# Patient Record
Sex: MALE | URBAN - METROPOLITAN AREA
[De-identification: names, ages, dates, MRNs, and addresses within clinical notes are randomized per-mention and may not be internally consistent; named-entity substitution may affect disease eponyms.]

---

## 2024-02-28 ENCOUNTER — APPOINTMENT (RX ONLY)
Dept: URBAN - METROPOLITAN AREA CLINIC 2 | Facility: CLINIC | Age: 64
Setting detail: DERMATOLOGY
End: 2024-02-28

## 2024-02-28 DIAGNOSIS — L20.89 OTHER ATOPIC DERMATITIS: ICD-10-CM | Status: INADEQUATELY CONTROLLED

## 2024-02-28 PROCEDURE — 99203 OFFICE O/P NEW LOW 30 MIN: CPT

## 2024-02-28 PROCEDURE — ? TREATMENT REGIMEN

## 2024-02-28 PROCEDURE — ? COUNSELING

## 2024-02-28 ASSESSMENT — LOCATION DETAILED DESCRIPTION DERM
LOCATION DETAILED: LEFT PROXIMAL DORSAL FOREARM
LOCATION DETAILED: RIGHT PROXIMAL DORSAL FOREARM
LOCATION DETAILED: LEFT SUPERIOR UPPER BACK

## 2024-02-28 ASSESSMENT — LOCATION SIMPLE DESCRIPTION DERM
LOCATION SIMPLE: RIGHT FOREARM
LOCATION SIMPLE: LEFT FOREARM
LOCATION SIMPLE: LEFT UPPER BACK

## 2024-02-28 ASSESSMENT — LOCATION ZONE DERM
LOCATION ZONE: TRUNK
LOCATION ZONE: ARM

## 2024-02-28 ASSESSMENT — ITCH NUMERIC RATING SCALE: HOW SEVERE IS YOUR ITCHING?: 5

## 2024-02-28 ASSESSMENT — BSA RASH: BSA RASH: 10

## 2024-02-28 NOTE — PROCEDURE: COUNSELING
Antihistamine Recommendations: Recommended cetirizine OTC PRN, discussed ok to go up to 4x ’s recommended dosing if needed
Detail Level: Generalized
Bleach Bath Recommendations: Recommend quarter cup of bleach diluted in full tub of lukewarm water and to soak for 10 minutes followed by liberal application of moisturizer while skin is damp
Moisturizer Recommendations: Discussed options such as CeraVe, Eucerin, Aquafore, and Aveeno

## 2024-02-28 NOTE — PROCEDURE: TREATMENT REGIMEN
Plan: Medical to help apply to back area due to patient unable to reach back or patient to use back applicator to apply to difficult to reach areas. Advised to use fragrance free soaps to help prevent future flares.
Continue Regimen: Triamcinolone ointment twice daily up to 2 weeks then as needed
Otc Regimen: Aquaphor as needed.
Detail Level: Simple

## 2024-02-28 NOTE — HPI: RASH
What Type Of Note Output Would You Prefer (Optional)?: Standard Output
Is The Patient Presenting As Previously Scheduled?: Yes
How Severe Is Your Rash?: moderate
Is This A New Presentation, Or A Follow-Up?: Rash
Additional History: Patient states he took an oral course of prednisone at first side of the rash. He said the prednisone would clear up the rash, however return once he finished a course. He’s been applying TAC and has seen moderate improvement.